# Patient Record
Sex: MALE | Race: WHITE | NOT HISPANIC OR LATINO | URBAN - METROPOLITAN AREA
[De-identification: names, ages, dates, MRNs, and addresses within clinical notes are randomized per-mention and may not be internally consistent; named-entity substitution may affect disease eponyms.]

---

## 2024-01-24 ENCOUNTER — OFFICE VISIT (OUTPATIENT)
Dept: URGENT CARE | Facility: CLINIC | Age: 39
End: 2024-01-24
Payer: COMMERCIAL

## 2024-01-24 VITALS
SYSTOLIC BLOOD PRESSURE: 120 MMHG | DIASTOLIC BLOOD PRESSURE: 84 MMHG | TEMPERATURE: 99.6 F | OXYGEN SATURATION: 97 % | RESPIRATION RATE: 14 BRPM | HEART RATE: 98 BPM | WEIGHT: 210 LBS | BODY MASS INDEX: 27.83 KG/M2 | HEIGHT: 73 IN

## 2024-01-24 DIAGNOSIS — R52 BODY ACHES: ICD-10-CM

## 2024-01-24 DIAGNOSIS — J10.1 INFLUENZA B: Primary | ICD-10-CM

## 2024-01-24 LAB
POC TRIPLEX FLU A-AG: ABNORMAL
POC TRIPLEX FLU B-AG: ABNORMAL
POC TRIPLEX SARSCOV-2 AG: ABNORMAL

## 2024-01-24 PROCEDURE — 1036F TOBACCO NON-USER: CPT | Performed by: PHYSICIAN ASSISTANT

## 2024-01-24 PROCEDURE — 87428 SARSCOV & INF VIR A&B AG IA: CPT | Performed by: PHYSICIAN ASSISTANT

## 2024-01-24 PROCEDURE — 99204 OFFICE O/P NEW MOD 45 MIN: CPT | Performed by: PHYSICIAN ASSISTANT

## 2024-01-24 RX ORDER — BUPRENORPHINE HYDROCHLORIDE 8 MG/1
16 TABLET SUBLINGUAL
COMMUNITY

## 2024-01-24 RX ORDER — IBUPROFEN 600 MG/1
600 TABLET ORAL EVERY 6 HOURS PRN
COMMUNITY
Start: 2023-03-01

## 2024-01-24 RX ORDER — LORATADINE 10 MG/1
10 TABLET ORAL DAILY
COMMUNITY
Start: 2023-03-01

## 2024-01-24 RX ORDER — METHYLPHENIDATE HYDROCHLORIDE 10 MG/1
10 TABLET ORAL 2 TIMES DAILY
COMMUNITY

## 2024-01-24 RX ORDER — OMEPRAZOLE 40 MG/1
40 CAPSULE, DELAYED RELEASE ORAL DAILY
COMMUNITY
Start: 2023-03-01

## 2024-01-24 ASSESSMENT — ENCOUNTER SYMPTOMS
COUGH: 1
CARDIOVASCULAR NEGATIVE: 1
EYES NEGATIVE: 1
SORE THROAT: 0
PSYCHIATRIC NEGATIVE: 1
ENDOCRINE NEGATIVE: 1
ALLERGIC/IMMUNOLOGIC NEGATIVE: 1
MUSCULOSKELETAL NEGATIVE: 1
HEMATOLOGIC/LYMPHATIC NEGATIVE: 1
FEVER: 1
SHORTNESS OF BREATH: 1
NEUROLOGICAL NEGATIVE: 1
GASTROINTESTINAL NEGATIVE: 1

## 2024-01-24 ASSESSMENT — PAIN SCALES - GENERAL: PAINLEVEL: 5

## 2024-01-24 NOTE — PATIENT INSTRUCTIONS
Fluids and rest  Motrin or tylenol as directed for pain or fever  OTC cough/cold med as directed  Pcp follow up this week if not improving or worsening  ER visit anytime 24/7 for acute worsening or changing conditon

## 2024-01-25 NOTE — PROGRESS NOTES
"Subjective   Patient ID: GLORIA Macdonald is a 38 y.o. male.      History provided by:  Patient   used: No    Cough  Associated symptoms include a fever and shortness of breath. Pertinent negatives include no ear pain or sore throat.   This is a 38 yr old male here for URI sxs, dyspnea, cough productive of green/yellow sputum, and low grade temp x 4 days. No sore throat or ear pain. Former smoker.  Review of Systems   Constitutional:  Positive for fever.   HENT:  Positive for congestion. Negative for ear pain and sore throat.    Eyes: Negative.    Respiratory:  Positive for cough and shortness of breath.    Cardiovascular: Negative.    Gastrointestinal: Negative.    Endocrine: Negative.    Genitourinary: Negative.    Musculoskeletal: Negative.    Skin: Negative.    Allergic/Immunologic: Negative.    Neurological: Negative.    Hematological: Negative.    Psychiatric/Behavioral: Negative.     All other systems reviewed and are negative.  /84   Pulse 98   Temp 37.6 °C (99.6 °F) (Temporal)   Resp 14   Ht 1.854 m (6' 1\")   Wt 95.3 kg (210 lb)   SpO2 97%   BMI 27.71 kg/m²     Objective   Physical Exam  Vitals and nursing note reviewed.   Constitutional:       Appearance: Normal appearance.   HENT:      Head: Normocephalic and atraumatic.      Right Ear: Tympanic membrane and ear canal normal.      Left Ear: Tympanic membrane and ear canal normal.      Mouth/Throat:      Mouth: Mucous membranes are moist.      Pharynx: Oropharynx is clear.   Cardiovascular:      Rate and Rhythm: Normal rate and regular rhythm.   Pulmonary:      Effort: Pulmonary effort is normal.      Breath sounds: Normal breath sounds.   Musculoskeletal:      Cervical back: Neck supple.   Lymphadenopathy:      Cervical: No cervical adenopathy.   Skin:     General: Skin is warm and dry.   Neurological:      General: No focal deficit present.      Mental Status: He is alert and oriented to person, place, and time. "   Psychiatric:         Mood and Affect: Mood normal.         Behavior: Behavior normal.     Assessment:  Influenza B    Plan:  Fluids and rest  Motrin or tylenol as directed  OTC cough/cold med as directed  Pcp follow up this week if not improving or worsening  ER visit anytime 24/7 for acute worsening or changing condition

## 2024-01-26 ENCOUNTER — OFFICE VISIT (OUTPATIENT)
Dept: URGENT CARE | Facility: CLINIC | Age: 39
End: 2024-01-26
Payer: COMMERCIAL

## 2024-01-26 VITALS
SYSTOLIC BLOOD PRESSURE: 122 MMHG | HEART RATE: 69 BPM | DIASTOLIC BLOOD PRESSURE: 76 MMHG | RESPIRATION RATE: 18 BRPM | OXYGEN SATURATION: 97 % | TEMPERATURE: 97.7 F

## 2024-01-26 DIAGNOSIS — J10.1 INFLUENZA B: Primary | ICD-10-CM

## 2024-01-26 DIAGNOSIS — R06.2 WHEEZING: ICD-10-CM

## 2024-01-26 PROCEDURE — 1036F TOBACCO NON-USER: CPT | Performed by: PHYSICIAN ASSISTANT

## 2024-01-26 PROCEDURE — 99214 OFFICE O/P EST MOD 30 MIN: CPT | Performed by: PHYSICIAN ASSISTANT

## 2024-01-26 RX ORDER — ALBUTEROL SULFATE 90 UG/1
2 AEROSOL, METERED RESPIRATORY (INHALATION) EVERY 6 HOURS PRN
Qty: 18 G | Refills: 0 | Status: SHIPPED | OUTPATIENT
Start: 2024-01-26 | End: 2025-01-25

## 2024-01-26 RX ORDER — METHYLPREDNISOLONE 4 MG/1
TABLET ORAL
Qty: 21 TABLET | Refills: 0 | Status: SHIPPED | OUTPATIENT
Start: 2024-01-26

## 2024-01-26 NOTE — PROGRESS NOTES
Subjective   Patient ID: GLORIA Macdonald is a 38 y.o. male who presents for work note.  Patient tested positive for flu B 2 days ago.  He is here today for note to excuse him from work.  He denies any worsening of symptoms denies any chest pain shortness of breath nausea vomiting diarrhea or abdominal pain.  Symptoms improving but still not feeling well enough to return to work today and is requesting work note.  Patient also notes that his wheezing has been worse over the last few days.  He is a former smoker has not smoked in several months but does note a history of recurrent bronchitis in the past    No past medical history on file.      The remainder of the systems were reviewed and are negative unless noted above      Objective   /76   Pulse 69   Temp 36.5 °C (97.7 °F)   Resp 18   SpO2 97%   Physical Exam  Vitals reviewed.   Constitutional:       General: He is not in acute distress.     Appearance: Normal appearance. He is not toxic-appearing.   HENT:      Head: Normocephalic.      Right Ear: Tympanic membrane and ear canal normal. No tenderness. No mastoid tenderness.      Left Ear: Tympanic membrane and ear canal normal. No tenderness. No mastoid tenderness.      Nose: Congestion and rhinorrhea present.      Mouth/Throat:      Mouth: Mucous membranes are moist.      Pharynx: Oropharynx is clear. Posterior oropharyngeal erythema present.   Eyes:      Conjunctiva/sclera: Conjunctivae normal.      Pupils: Pupils are equal, round, and reactive to light.   Cardiovascular:      Rate and Rhythm: Normal rate and regular rhythm.      Heart sounds: No murmur heard.  Pulmonary:      Effort: No respiratory distress.      Breath sounds: No stridor. Wheezing present. No rhonchi or rales.   Chest:      Chest wall: No tenderness.   Abdominal:      Tenderness: There is no abdominal tenderness. There is no guarding.   Musculoskeletal:         General: Normal range of motion.   Skin:     General: Skin is warm and  dry.      Capillary Refill: Capillary refill takes less than 2 seconds.      Findings: No erythema.   Neurological:      General: No focal deficit present.      Mental Status: He is alert.         Assessment/Plan   Problem List Items Addressed This Visit       Influenza B - Primary    Wheezing    Relevant Medications    methylPREDNISolone (Medrol Dospak) 4 mg tablets    albuterol 90 mcg/actuation inhaler      -Discussed with patient the antibiotics necessary at this point but given the presence of wheezing I have prescribed the patient Medrol pack, albuterol recommending continued symptomatic therapy.  If symptoms fail to improve significantly or worsen do not hesitate to return for evaluation of need of antibiotics but at this point I would caution patient against use of the antibiotics as this is a viral infection unlikely to be significantly improved with use of antibiotics and antibiotics do have side effects associated with them and antibiotic resistance there is a growing concern.  Work note provided for the patient

## 2024-01-26 NOTE — LETTER
January 26, 2024     Patient: GLORIA Macdonald   YOB: 1985   Date of Visit: 1/26/2024       To Whom It May Concern:    It is my medical opinion that GLORIA Macdonald may return to work on 01/29/2024 .    If you have any questions or concerns, please don't hesitate to call.         Sincerely,        Wally Rdz PA-C    CC: No Recipients

## 2024-01-26 NOTE — PATIENT INSTRUCTIONS
Assessment/Plan   Problem List Items Addressed This Visit       Influenza B - Primary    Wheezing    Relevant Medications    methylPREDNISolone (Medrol Dospak) 4 mg tablets    albuterol 90 mcg/actuation inhaler      -Discussed with patient the antibiotics necessary at this point but given the presence of wheezing I have prescribed the patient Medrol pack, albuterol recommending continued symptomatic therapy.  If symptoms fail to improve significantly or worsen do not hesitate to return for evaluation of need of antibiotics but at this point I would caution patient against use of the antibiotics as this is a viral infection unlikely to be significantly improved with use of antibiotics and antibiotics do have side effects associated with them and antibiotic resistance there is a growing concern.  Work note provided for the patient